# Patient Record
Sex: MALE | Race: WHITE | NOT HISPANIC OR LATINO | Employment: FULL TIME | URBAN - NONMETROPOLITAN AREA
[De-identification: names, ages, dates, MRNs, and addresses within clinical notes are randomized per-mention and may not be internally consistent; named-entity substitution may affect disease eponyms.]

---

## 2017-03-14 RX ORDER — MELOXICAM 15 MG/1
TABLET ORAL
Qty: 90 TAB | Refills: 0 | Status: SHIPPED | OUTPATIENT
Start: 2017-03-14 | End: 2017-03-22 | Stop reason: SDUPTHER

## 2017-03-22 ENCOUNTER — OFFICE VISIT (OUTPATIENT)
Dept: MEDICAL GROUP | Facility: PHYSICIAN GROUP | Age: 62
End: 2017-03-22
Payer: COMMERCIAL

## 2017-03-22 VITALS
SYSTOLIC BLOOD PRESSURE: 126 MMHG | HEIGHT: 69 IN | RESPIRATION RATE: 12 BRPM | HEART RATE: 78 BPM | OXYGEN SATURATION: 94 % | TEMPERATURE: 97.5 F | DIASTOLIC BLOOD PRESSURE: 82 MMHG | BODY MASS INDEX: 40.58 KG/M2 | WEIGHT: 274 LBS

## 2017-03-22 DIAGNOSIS — R73.03 PREDIABETES: Chronic | ICD-10-CM

## 2017-03-22 DIAGNOSIS — M25.511 CHRONIC PAIN OF BOTH SHOULDERS: ICD-10-CM

## 2017-03-22 DIAGNOSIS — E55.9 VITAMIN D DEFICIENCY: Chronic | ICD-10-CM

## 2017-03-22 DIAGNOSIS — E78.5 HYPERLIPIDEMIA, UNSPECIFIED HYPERLIPIDEMIA TYPE: ICD-10-CM

## 2017-03-22 DIAGNOSIS — M25.512 CHRONIC PAIN OF BOTH SHOULDERS: ICD-10-CM

## 2017-03-22 DIAGNOSIS — Z79.891 CHRONIC USE OF OPIATE FOR THERAPEUTIC PURPOSE: ICD-10-CM

## 2017-03-22 DIAGNOSIS — M25.561 CHRONIC PAIN OF RIGHT KNEE: ICD-10-CM

## 2017-03-22 DIAGNOSIS — G89.29 CHRONIC PAIN OF BOTH SHOULDERS: ICD-10-CM

## 2017-03-22 DIAGNOSIS — G89.29 CHRONIC PAIN OF RIGHT KNEE: ICD-10-CM

## 2017-03-22 PROCEDURE — 99213 OFFICE O/P EST LOW 20 MIN: CPT | Performed by: NURSE PRACTITIONER

## 2017-03-22 RX ORDER — MELOXICAM 15 MG/1
15 TABLET ORAL
Qty: 90 TAB | Refills: 3 | Status: SHIPPED | OUTPATIENT
Start: 2017-03-22 | End: 2018-09-07

## 2017-03-22 RX ORDER — TRAMADOL HYDROCHLORIDE 50 MG/1
50-100 TABLET ORAL EVERY 6 HOURS PRN
Qty: 240 TAB | Refills: 0 | Status: SHIPPED | OUTPATIENT
Start: 2017-04-22 | End: 2017-05-22

## 2017-03-22 RX ORDER — ATORVASTATIN CALCIUM 40 MG/1
40 TABLET, FILM COATED ORAL
Qty: 90 TAB | Refills: 3 | Status: SHIPPED | OUTPATIENT
Start: 2017-03-22 | End: 2018-02-06

## 2017-03-22 RX ORDER — TRAMADOL HYDROCHLORIDE 50 MG/1
50-100 TABLET ORAL EVERY 6 HOURS PRN
Qty: 240 TAB | Refills: 0 | Status: SHIPPED | OUTPATIENT
Start: 2017-03-22 | End: 2017-03-22 | Stop reason: SDUPTHER

## 2017-03-22 RX ORDER — TRAMADOL HYDROCHLORIDE 50 MG/1
50-100 TABLET ORAL EVERY 6 HOURS PRN
Qty: 240 TAB | Refills: 0 | Status: SHIPPED | OUTPATIENT
Start: 2017-05-22 | End: 2017-06-16 | Stop reason: SDUPTHER

## 2017-03-22 NOTE — PROGRESS NOTES
Chief Complaint   Patient presents with   • Chronic Opiate Therapy       HISTORY OF PRESENT ILLNESS: Patient is a @ age 61 here  today to discuss:    Interval history:     Hospitalizations NO     Injuries NO     Illness NO     Patient is due for his screening labs for next visit these will be ordered ahead of time. He is primarily here for his refill on his tramadol.        Shoulder pain, bilateral  ..    Chronic use of opiate for therapeutic purpose  Chronic pain recheck: shoulder pain  Last dose of controlled substance: Two tramadol 50mg today at 12: 30 pm   Chronic pain treated with tramadol taken more than 5 times a day    He  reports that he drinks about 36.0 oz of alcohol per week.  He  reports that he does not use illicit drugs.    Consequences of Chronic Opiate therapy:  (5 A's)  Analgesia: Compared to no treatment or prior treatment, pain is currently improved  Activity: improved  Adverse Events: He denies itchy skin  Aberrant Behaviors: He reports he is taking medication as prescribed and is not veering from agreed treatment regimen. There have been no inappropriate refills or lost/stolen meds reported.   Affect/Mood: Pain is not impacting patient's mood.  Patient denies depression/anxiety.    Nonnarcotic treatments that are being used: NSAIDs/ROSALES-2, topical agents and meloxicam.   Treatment goals discussed.    Opioid Risk Score: No Value exists for the : RNW#180    Interpretation of Opioid Risk Score   Score 0-3 = Low risk of abuse. Do UDS at least once per year.  Score 4-7 = Moderate risk of abuse. Do UDS 1-4 times per year.  Score 8+ = High risk of abuse. Refer to specialist.    Last order of CONTROLLED SUBSTANCE TREATMENT AGREEMENT was found on 9/20/2016 from Office Visit on 9/20/2016     UDS Summary     Patient has no health maintenance due at this time        Most recent UDS reviewed today and is consistent with prescribed medications. Patient to have repeat UDS as it was noted that he had  hydrocodone and his last when he is only taking tramadol.  I have reviewed the medical records, the Prescription Monitoring Program and I have determined that controlled substance treatment is medically indicated.          Prediabetes  Patient has chronic problem with elevated blood sugar. He will obtain fasting labs next visit.     Vitamin D deficiency  Patient has history of vitamin D problems.         Allergies:Nkda    Current Outpatient Prescriptions Ordered in [facility]   Medication Sig Dispense Refill   • [START ON 4/22/2017] tramadol (ULTRAM) 50 MG Tab Take 1-2 Tabs by mouth every 6 hours as needed for Moderate Pain or Severe Pain for up to 30 days. 240 Tab 0   • [START ON 5/22/2017] tramadol (ULTRAM) 50 MG Tab Take 1-2 Tabs by mouth every 6 hours as needed for Moderate Pain or Severe Pain for up to 30 days. 240 Tab 0   • meloxicam (MOBIC) 15 MG tablet Take 1 Tab by mouth every day. 90 Tab 3   • atorvastatin (LIPITOR) 40 MG Tab Take 1 Tab by mouth every day. 90 Tab 3   • triamcinolone acetonide (KENALOG) 0.1 % CREA Apply to affected area(s) 2 times a day. 80 g 3   • chlorhexidine (PERIDEX) 0.12 % Solution      • doxycycline (VIBRAMYCIN) 100 MG Tab      • hydrocodone-acetaminophen (NORCO) 5-325 MG Tab per tablet      • glucosamine Sulfate 500 MG Cap Take 500 mg by mouth 3 times a day, with meals.     • ascorbic acid (ASCORBIC ACID) 500 MG Tab Take 500 mg by mouth every day.     • hydrocodone-acetaminophen (NORCO) 7.5-325 MG per tablet Take 1 Tab by mouth 2 times a day as needed. 60 Tab 0   • Cholecalciferol (VITAMIN D3) 5000 UNITS CAPS Take 1 Cap by mouth every day. 30 Cap 11     No current [facility]-ordered facility-administered medications on file.       Past Medical History   Diagnosis Date   • Other abnormal glucose      BS 12/08 104   • Mixed hyperlipidemia      controlled   • Tobacco use disorder    • Allergic rhinitis, cause unspecified      controlled   • Osteoarthrosis, unspecified whether generalized or  "localized, unspecified site      controlled   • Arthritis      \"everywhere\"   • Pain      right knee   • Pure hypercholesterolemia 2009   • Elevated BP 2010   • Prediabetes 2010   • Unspecified vitamin D deficiency 2010   • Mixed hyperlipidemia 2010   • Obesity (BMI 30-39.9) 2012   • Cutaneous skin tags 2014   • Dental disorder    • Unspecified cataract        Social History   Substance Use Topics   • Smoking status: Former Smoker -- 0.50 packs/day for 40 years     Types: Cigarettes, Cigars     Quit date: 2015   • Smokeless tobacco: Never Used   • Alcohol Use: 36.0 oz/week     54 Standard drinks or equivalent, 6 Glasses of wine, 0 Cans of beer, 0 Shots of liquor per week       Family Status   Relation Status Death Age   • Mother  76     Breast cancer with mets   • Father  76     CAD, HTN, Dementia   • Brother Alive      56 in    • Sister Alive      53 in      Family History   Problem Relation Age of Onset   • Diabetes Mother    • Hypertension Mother    • Heart Disease Father      coronary artery disease with first MI at age 40   • Hypertension Father    • Hypertension Brother    • Hyperlipidemia Brother      hypercholestolemia   • Diabetes Brother    • Diabetes Other      Maternal family   • Hyperlipidemia Other       Maternal family history of  hypercholestolemia   • Diabetes Sister      Type 2 DM   • Lung Disease Sister      copd       ROS: As documented in my HPI      Exam:  Blood pressure 126/82, pulse 78, temperature 36.4 °C (97.5 °F), resp. rate 12, height 1.753 m (5' 9.02\"), weight 124.286 kg (274 lb), SpO2 94 %.  General:  Well nourished, well developed male in NAD  Head: Nontender scalp. No lesions  Neck: Supple. Symmetric Thyroid palpated. No bruits  Pulmonary:  Normal effort. No rales, ronchi, or wheezing.  Cardiovascular: Regular rate and rhythm without murmur.   Abdomen: Soft nontender, normal bowel sounds  Extremities: no clubbing, cyanosis, " or edema.  Shoulders: pain to ROM.   Lower back: paraspinous muscles tender  Knee: pain to walking.   Neurological: No focal deficits    Please note that this dictation was created using voice recognition software. I have made every reasonable attempt to correct obvious errors, but I expect that there are errors of grammar and possibly content that I did not discover before finalizing the note.    Assessment/Plan:  1. Chronic pain of both shoulders  Controlled Substance Treatment Agreement    COMP METABOLIC PANEL    LIPID PANEL    VITAMIN D,25 HYDROXY   2. Chronic use of opiate for therapeutic purpose  Controlled Substance Treatment Agreement    COMP METABOLIC PANEL    LIPID PANEL    VITAMIN D,25 HYDROXY   3. Chronic pain of right knee  Controlled Substance Treatment Agreement    tramadol (ULTRAM) 50 MG Tab    tramadol (ULTRAM) 50 MG Tab    COMP METABOLIC PANEL    LIPID PANEL    VITAMIN D,25 HYDROXY    DISCONTINUED: tramadol (ULTRAM) 50 MG Tab   4. Hyperlipidemia, unspecified hyperlipidemia type  atorvastatin (LIPITOR) 40 MG Tab    COMP METABOLIC PANEL    LIPID PANEL    VITAMIN D,25 HYDROXY   5. Prediabetes     6. Vitamin D deficiency

## 2017-03-22 NOTE — ASSESSMENT & PLAN NOTE
Chronic pain recheck: shoulder pain  Last dose of controlled substance: Two tramadol 50mg today at 12: 30 pm   Chronic pain treated with tramadol taken more than 5 times a day    He  reports that he drinks about 36.0 oz of alcohol per week.  He  reports that he does not use illicit drugs.    Consequences of Chronic Opiate therapy:  (5 A's)  Analgesia: Compared to no treatment or prior treatment, pain is currently improved  Activity: improved  Adverse Events: He denies itchy skin  Aberrant Behaviors: He reports he is taking medication as prescribed and is not veering from agreed treatment regimen. There have been no inappropriate refills or lost/stolen meds reported.   Affect/Mood: Pain is not impacting patient's mood.  Patient denies depression/anxiety.    Nonnarcotic treatments that are being used: NSAIDs/ROSALES-2, topical agents and meloxicam.   Treatment goals discussed.    Opioid Risk Score: No Value exists for the : RNW#180    Interpretation of Opioid Risk Score   Score 0-3 = Low risk of abuse. Do UDS at least once per year.  Score 4-7 = Moderate risk of abuse. Do UDS 1-4 times per year.  Score 8+ = High risk of abuse. Refer to specialist.    Last order of CONTROLLED SUBSTANCE TREATMENT AGREEMENT was found on 9/20/2016 from Office Visit on 9/20/2016     UDS Summary     Patient has no health maintenance due at this time        Most recent UDS reviewed today and is consistent with prescribed medications.    I have reviewed the medical records, the Prescription Monitoring Program and I have determined that controlled substance treatment is medically indicated.

## 2017-03-22 NOTE — MR AVS SNAPSHOT
"        Sincere Tinsley   3/22/2017 1:00 PM   Office Visit   MRN: [id number]    Department:  [facility]   Dept Phone:  [phone]    Description:  Male : 1955   Provider:  FOSTER Carlin           Reason for Visit     Chronic Opiate Therapy           Allergies as of 3/22/2017     Allergen Noted Reactions    Nkda [No Known Drug Allergy] 10/21/2009         You were diagnosed with     Chronic pain of both shoulders   [574969]       Chronic use of opiate for therapeutic purpose   [2371041]       Chronic pain of right knee   [8941077]       Hyperlipidemia, unspecified hyperlipidemia type   [8248236]         Vital Signs     Blood Pressure Pulse Temperature Respirations Height Weight    126/82 mmHg 78 36.4 °C (97.5 °F) 12 1.753 m (5' 9.02\") 124.286 kg (274 lb)    Body Mass Index Oxygen Saturation Smoking Status             40.44 kg/m2 94% Former Smoker         Basic Information     Date Of Birth Sex Race Ethnicity Preferred Language    1955 Male White Non- English      Problem List              ICD-10-CM Priority Class Noted - Resolved    Seasonal allergies J30.2 Low  2009 - Present    Prediabetes (Chronic) R73.03 High  2010 - Present    Vitamin D deficiency (Chronic) E55.9 Medium  2010 - Present    Mixed hyperlipidemia (Chronic) E78.2 Medium  2010 - Present    Obesity (BMI 30-39.9) (Chronic) E66.9 Medium  2012 - Present    Dyshidrotic eczema L30.1 Medium  2/10/2014 - Present    Shoulder pain, bilateral M25.511, M25.512 High  2/10/2014 - Present    Cutaneous skin tags L91.8 Low  2014 - Present    Bilateral chronic knee pain M25.561, M25.562, G89.29 High  2014 - Present    Parotid neoplasm D49.0 Medium  2015 - Present    Bilateral edema of lower extremity R60.0 High  8/3/2015 - Present    Chronic use of opiate for therapeutic purpose Z79.899   3/22/2017 - Present      Health Maintenance        Date Due Completion Dates    IMM ZOSTER VACCINE " 8/16/2016 ---    IMM INFLUENZA (1) 9/1/2016 ---    IMM DTaP/Tdap/Td Vaccine (2 - Td) 4/16/2022 4/16/2012    COLONOSCOPY 4/7/2024 4/7/2014            Current Immunizations     Tdap Vaccine 4/16/2012  2:32 PM      Below and/or attached are the medications your provider expects you to take. Review all of your home medications and newly ordered medications with your provider and/or pharmacist. Follow medication instructions as directed by your provider and/or pharmacist. Please keep your medication list with you and share with your provider. Update the information when medications are discontinued, doses are changed, or new medications (including over-the-counter products) are added; and carry medication information at all times in the event of emergency situations     Allergies:  NKDA - (reactions not documented)               Medications  Valid as of: March 22, 2017 -  1:22 PM    Generic Name Brand Name Tablet Size Instructions for use    Ascorbic Acid (Tab) ascorbic acid 500 MG Take 500 mg by mouth every day.        Atorvastatin Calcium (Tab) LIPITOR 40 MG Take 1 Tab by mouth every day.        Chlorhexidine Gluconate (Solution) PERIDEX 0.12 %         Cholecalciferol (Cap) vitamin D3 5000 UNITS Take 1 Cap by mouth every day.        Doxycycline Hyclate (Tab) VIBRAMYCIN 100 MG         Glucosamine Sulfate (Cap) glucosamine Sulfate 500 MG Take 500 mg by mouth 3 times a day, with meals.        Hydrocodone-Acetaminophen (Tab) NORCO 7.5-325 MG Take 1 Tab by mouth 2 times a day as needed.        Hydrocodone-Acetaminophen (Tab) NORCO 5-325 MG         Meloxicam (Tab) MOBIC 15 MG Take 1 Tab by mouth every day.        TraMADol HCl (Tab) ULTRAM 50 MG Take 1-2 Tabs by mouth every 6 hours as needed for Moderate Pain or Severe Pain for up to 30 days.        TraMADol HCl (Tab) ULTRAM 50 MG Take 1-2 Tabs by mouth every 6 hours as needed for Moderate Pain or Severe Pain for up to 30 days.        Triamcinolone Acetonide (Cream) KENALOG  0.1 % Apply to affected area(s) 2 times a day.        .                 Medicines prescribed today were sent to:     [id number] - [place], [place] - [address]    [address] [place] [address]    Phone: [phone] Fax: [phone]    Open 24 Hours?: No      Medication refill instructions:       If your prescription bottle indicates you have medication refills left, it is not necessary to call your provider’s office. Please contact your pharmacy and they will refill your medication.    If your prescription bottle indicates you do not have any refills left, you may request refills at any time through one of the following ways: The online Someecards system (except Urgent Care), by calling your provider’s office, or by asking your pharmacy to contact your provider’s office with a refill request. Medication refills are processed only during regular business hours and may not be available until the next business day. Your provider may request additional information or to have a follow-up visit with you prior to refilling your medication.   *Please Note: Medication refills are assigned a new Rx number when refilled electronically. Your pharmacy may indicate that no refills were authorized even though a new prescription for the same medication is available at the pharmacy. Please request the medicine by name with the pharmacy before contacting your provider for a refill.        Your To Do List     Future Labs/Procedures Complete By Expires    COMP METABOLIC PANEL  As directed 3/22/2018    VITAMIN D,25 HYDROXY  As directed 3/22/2018      Other Notes About Your Plan     UDS - Tramadol and Hydrocodone.            Someecards Access Code: [email or web address]  Expires: 3/31/2017  3:11 PM    Someecards  A secure, online tool to manage your health information     [employer]’s Someecards® is a secure, online tool that connects you to your personalized health information from  the privacy of your home -- day or night - making it very easy for you to manage your healthcare. Once the activation process is completed, you can even access your medical information using the Samesurf jo, which is available for free in the Apple Jo store or Google Play store.     Samesurf provides the following levels of access (as shown below):   My Chart Features   [facility] Doctor [facility]  Specialists [facility]  Urgent  Care Non-[facility]  Primary Care  Doctor   Email your healthcare team securely and privately 24/7 X X X    Manage appointments: schedule your next appointment; view details of past/upcoming appointments X      Request prescription refills. X      View recent personal medical records, including lab and immunizations X X X X   View health record, including health history, allergies, medications X X X X   Read reports about your outpatient visits, procedures, consult and ER notes X X X X   See your discharge summary, which is a recap of your hospital and/or ER visit that includes your diagnosis, lab results, and care plan. X X       How to register for Samesurf:  1. Go to  [email or web address].  2. Click on the Sign Up Now box, which takes you to the New Member Sign Up page. You will need to provide the following information:  a. Enter your Samesurf Access Code exactly as it appears at the top of this page. (You will not need to use this code after you’ve completed the sign-up process. If you do not sign up before the expiration date, you must request a new code.)   b. Enter your date of birth.   c. Enter your home email address.   d. Click Submit, and follow the next screen’s instructions.  3. Create a Samesurf ID. This will be your Samesurf login ID and cannot be changed, so think of one that is secure and easy to remember.  4. Create a Samesurf password. You can change your password at any time.  5. Enter your Password Reset Question and Answer. This can be used at a later time if you  forget your password.   6. Enter your e-mail address. This allows you to receive e-mail notifications when new information is available in [employer].  7. Click Sign Up. You can now view your health information.    For assistance activating your [employer] account, call [phone]

## 2017-06-16 ENCOUNTER — OFFICE VISIT (OUTPATIENT)
Dept: MEDICAL GROUP | Facility: PHYSICIAN GROUP | Age: 62
End: 2017-06-16
Payer: COMMERCIAL

## 2017-06-16 ENCOUNTER — HOSPITAL ENCOUNTER (OUTPATIENT)
Dept: LAB | Facility: MEDICAL CENTER | Age: 62
End: 2017-06-16
Attending: NURSE PRACTITIONER
Payer: COMMERCIAL

## 2017-06-16 VITALS
WEIGHT: 271 LBS | RESPIRATION RATE: 16 BRPM | TEMPERATURE: 98.7 F | BODY MASS INDEX: 40.14 KG/M2 | HEART RATE: 75 BPM | SYSTOLIC BLOOD PRESSURE: 124 MMHG | OXYGEN SATURATION: 94 % | HEIGHT: 69 IN | DIASTOLIC BLOOD PRESSURE: 82 MMHG

## 2017-06-16 DIAGNOSIS — E78.2 MIXED HYPERLIPIDEMIA: Chronic | ICD-10-CM

## 2017-06-16 DIAGNOSIS — R73.03 PREDIABETES: Chronic | ICD-10-CM

## 2017-06-16 DIAGNOSIS — M25.561 CHRONIC PAIN OF RIGHT KNEE: ICD-10-CM

## 2017-06-16 DIAGNOSIS — G89.29 BILATERAL CHRONIC KNEE PAIN: ICD-10-CM

## 2017-06-16 DIAGNOSIS — Z79.891 CHRONIC USE OF OPIATE FOR THERAPEUTIC PURPOSE: ICD-10-CM

## 2017-06-16 DIAGNOSIS — M25.511 CHRONIC PAIN OF BOTH SHOULDERS: ICD-10-CM

## 2017-06-16 DIAGNOSIS — E55.9 VITAMIN D DEFICIENCY: Chronic | ICD-10-CM

## 2017-06-16 DIAGNOSIS — D49.0 PAROTID NEOPLASM: ICD-10-CM

## 2017-06-16 DIAGNOSIS — M25.562 BILATERAL CHRONIC KNEE PAIN: ICD-10-CM

## 2017-06-16 DIAGNOSIS — M25.561 BILATERAL CHRONIC KNEE PAIN: ICD-10-CM

## 2017-06-16 DIAGNOSIS — G89.29 CHRONIC PAIN OF RIGHT KNEE: ICD-10-CM

## 2017-06-16 DIAGNOSIS — M25.512 CHRONIC PAIN OF BOTH SHOULDERS: ICD-10-CM

## 2017-06-16 DIAGNOSIS — G89.29 CHRONIC PAIN OF BOTH SHOULDERS: ICD-10-CM

## 2017-06-16 LAB
25(OH)D3 SERPL-MCNC: 31 NG/ML (ref 30–100)
ALBUMIN SERPL BCP-MCNC: 4.3 G/DL (ref 3.2–4.9)
ALBUMIN/GLOB SERPL: 1.7 G/DL
ALP SERPL-CCNC: 51 U/L (ref 30–99)
ALT SERPL-CCNC: 19 U/L (ref 2–50)
ANION GAP SERPL CALC-SCNC: 6 MMOL/L (ref 0–11.9)
AST SERPL-CCNC: 17 U/L (ref 12–45)
BASOPHILS # BLD AUTO: 0.9 % (ref 0–1.8)
BASOPHILS # BLD: 0.05 K/UL (ref 0–0.12)
BILIRUB SERPL-MCNC: 0.5 MG/DL (ref 0.1–1.5)
BUN SERPL-MCNC: 18 MG/DL (ref 8–22)
CALCIUM SERPL-MCNC: 9.1 MG/DL (ref 8.5–10.5)
CHLORIDE SERPL-SCNC: 108 MMOL/L (ref 96–112)
CHOLEST SERPL-MCNC: 101 MG/DL (ref 100–199)
CO2 SERPL-SCNC: 24 MMOL/L (ref 20–33)
CREAT SERPL-MCNC: 0.83 MG/DL (ref 0.5–1.4)
EOSINOPHIL # BLD AUTO: 0.33 K/UL (ref 0–0.51)
EOSINOPHIL NFR BLD: 6.3 % (ref 0–6.9)
ERYTHROCYTE [DISTWIDTH] IN BLOOD BY AUTOMATED COUNT: 40.8 FL (ref 35.9–50)
GFR SERPL CREATININE-BSD FRML MDRD: >60 ML/MIN/1.73 M 2
GLOBULIN SER CALC-MCNC: 2.6 G/DL (ref 1.9–3.5)
GLUCOSE SERPL-MCNC: 109 MG/DL (ref 65–99)
HCT VFR BLD AUTO: 41.9 % (ref 42–52)
HDLC SERPL-MCNC: 37 MG/DL
HGB BLD-MCNC: 14.2 G/DL (ref 14–18)
IMM GRANULOCYTES # BLD AUTO: 0.01 K/UL (ref 0–0.11)
IMM GRANULOCYTES NFR BLD AUTO: 0.2 % (ref 0–0.9)
LDLC SERPL CALC-MCNC: 43 MG/DL
LYMPHOCYTES # BLD AUTO: 2.09 K/UL (ref 1–4.8)
LYMPHOCYTES NFR BLD: 39.7 % (ref 22–41)
MCH RBC QN AUTO: 30.5 PG (ref 27–33)
MCHC RBC AUTO-ENTMCNC: 33.9 G/DL (ref 33.7–35.3)
MCV RBC AUTO: 89.9 FL (ref 81.4–97.8)
MONOCYTES # BLD AUTO: 0.5 K/UL (ref 0–0.85)
MONOCYTES NFR BLD AUTO: 9.5 % (ref 0–13.4)
NEUTROPHILS # BLD AUTO: 2.29 K/UL (ref 1.82–7.42)
NEUTROPHILS NFR BLD: 43.4 % (ref 44–72)
NRBC # BLD AUTO: 0 K/UL
NRBC BLD AUTO-RTO: 0 /100 WBC
PLATELET # BLD AUTO: 218 K/UL (ref 164–446)
PMV BLD AUTO: 10 FL (ref 9–12.9)
POTASSIUM SERPL-SCNC: 4.3 MMOL/L (ref 3.6–5.5)
PROT SERPL-MCNC: 6.9 G/DL (ref 6–8.2)
RBC # BLD AUTO: 4.66 M/UL (ref 4.7–6.1)
SODIUM SERPL-SCNC: 138 MMOL/L (ref 135–145)
T4 FREE SERPL-MCNC: 0.88 NG/DL (ref 0.53–1.43)
TRIGL SERPL-MCNC: 104 MG/DL (ref 0–149)
TSH SERPL DL<=0.005 MIU/L-ACNC: 1.19 UIU/ML (ref 0.3–3.7)
WBC # BLD AUTO: 5.3 K/UL (ref 4.8–10.8)

## 2017-06-16 PROCEDURE — 85025 COMPLETE CBC W/AUTO DIFF WBC: CPT

## 2017-06-16 PROCEDURE — 80053 COMPREHEN METABOLIC PANEL: CPT

## 2017-06-16 PROCEDURE — 84439 ASSAY OF FREE THYROXINE: CPT

## 2017-06-16 PROCEDURE — 82306 VITAMIN D 25 HYDROXY: CPT

## 2017-06-16 PROCEDURE — 36415 COLL VENOUS BLD VENIPUNCTURE: CPT

## 2017-06-16 PROCEDURE — 84443 ASSAY THYROID STIM HORMONE: CPT

## 2017-06-16 PROCEDURE — 99214 OFFICE O/P EST MOD 30 MIN: CPT | Performed by: NURSE PRACTITIONER

## 2017-06-16 PROCEDURE — 80061 LIPID PANEL: CPT

## 2017-06-16 RX ORDER — TRAMADOL HYDROCHLORIDE 50 MG/1
50-100 TABLET ORAL EVERY 6 HOURS PRN
Qty: 240 TAB | Refills: 0 | Status: SHIPPED | OUTPATIENT
Start: 2017-07-11 | End: 2017-06-16 | Stop reason: SDUPTHER

## 2017-06-16 RX ORDER — TRAMADOL HYDROCHLORIDE 50 MG/1
50-100 TABLET ORAL EVERY 6 HOURS PRN
Qty: 240 TAB | Refills: 0 | Status: SHIPPED | OUTPATIENT
Start: 2017-08-11 | End: 2017-08-30 | Stop reason: SDUPTHER

## 2017-06-16 ASSESSMENT — LIFESTYLE VARIABLES: HISTORY_ALCOHOL_USE: 1

## 2017-06-16 NOTE — MR AVS SNAPSHOT
"        Sincere Tinsley   2017 11:20 AM   Office Visit   MRN: [id number]    Department:  [facility]   Dept Phone:  [phone]    Description:  Male : 1955   Provider:  FOSTER Carlin           Reason for Visit     Chronic Opiate Therapy tramadol      Allergies as of 2017     Allergen Noted Reactions    Nkda [No Known Drug Allergy] 10/21/2009         You were diagnosed with     Chronic use of opiate for therapeutic purpose   [2054865]       Chronic pain of right knee   [1419815]       Vitamin D deficiency   [2972591]       Mixed hyperlipidemia   [272.2.ICD-9-CM]         Vital Signs     Blood Pressure Pulse Temperature Respirations Height Weight    124/82 mmHg 75 37.1 °C (98.7 °F) 16 1.753 m (5' 9\") 122.925 kg (271 lb)    Body Mass Index Oxygen Saturation Smoking Status             40.00 kg/m2 94% Former Smoker         Basic Information     Date Of Birth Sex Race Ethnicity Preferred Language    1955 Male White Non- English      Problem List              ICD-10-CM Priority Class Noted - Resolved    Seasonal allergies J30.2 Low  2009 - Present    Prediabetes (Chronic) R73.03 High  2010 - Present    Vitamin D deficiency (Chronic) E55.9 Medium  2010 - Present    Mixed hyperlipidemia (Chronic) E78.2 Medium  2010 - Present    Obesity (BMI 30-39.9) (Chronic) E66.9 Medium  2012 - Present    Dyshidrotic eczema L30.1 Medium  2/10/2014 - Present    Shoulder pain, bilateral M25.511, M25.512 High  2/10/2014 - Present    Cutaneous skin tags L91.8 Low  2014 - Present    Bilateral chronic knee pain M25.561, M25.562, G89.29 High  2014 - Present    Parotid neoplasm D49.0 Medium  2015 - Present    Bilateral edema of lower extremity R60.0 High  8/3/2015 - Present    Chronic use of opiate for therapeutic purpose Z79.891   3/22/2017 - Present      Health Maintenance        Date Due Completion Dates    IMM ZOSTER VACCINE 2016 ---    IMM " DTaP/Tdap/Td Vaccine (2 - Td) 4/16/2022 4/16/2012    COLONOSCOPY 4/7/2024 4/7/2014            Current Immunizations     Tdap Vaccine 4/16/2012  2:32 PM      Below and/or attached are the medications your provider expects you to take. Review all of your home medications and newly ordered medications with your provider and/or pharmacist. Follow medication instructions as directed by your provider and/or pharmacist. Please keep your medication list with you and share with your provider. Update the information when medications are discontinued, doses are changed, or new medications (including over-the-counter products) are added; and carry medication information at all times in the event of emergency situations     Allergies:  NKDA - (reactions not documented)               Medications  Valid as of: June 16, 2017 - 11:35 AM    Generic Name Brand Name Tablet Size Instructions for use    Ascorbic Acid (Tab) ascorbic acid 500 MG Take 500 mg by mouth every day.        Atorvastatin Calcium (Tab) LIPITOR 40 MG Take 1 Tab by mouth every day.        Chlorhexidine Gluconate (Solution) PERIDEX 0.12 %         Cholecalciferol (Cap) vitamin D3 5000 UNITS Take 1 Cap by mouth every day.        Doxycycline Hyclate (Tab) VIBRAMYCIN 100 MG         Glucosamine Sulfate (Cap) glucosamine Sulfate 500 MG Take 500 mg by mouth 3 times a day, with meals.        Hydrocodone-Acetaminophen (Tab) NORCO 7.5-325 MG Take 1 Tab by mouth 2 times a day as needed.        Hydrocodone-Acetaminophen (Tab) NORCO 5-325 MG         Meloxicam (Tab) MOBIC 15 MG Take 1 Tab by mouth every day.        TraMADol HCl (Tab) ULTRAM 50 MG Take 1-2 Tabs by mouth every 6 hours as needed for Moderate Pain or Severe Pain for up to 30 days.        Triamcinolone Acetonide (Cream) KENALOG 0.1 % Apply to affected area(s) 2 times a day.        .                 Medicines prescribed today were sent to:     [facility] [address] [place] [address], [place] [address] [facility] [address] [facility]  [facility] [address] [place] [address]    Phone: [phone] Fax: [phone]    Open 24 Hours?: No      Medication refill instructions:       If your prescription bottle indicates you have medication refills left, it is not necessary to call your provider’s office. Please contact your pharmacy and they will refill your medication.    If your prescription bottle indicates you do not have any refills left, you may request refills at any time through one of the following ways: The online [employer] system (except Urgent Care), by calling your provider’s office, or by asking your pharmacy to contact your provider’s office with a refill request. Medication refills are processed only during regular business hours and may not be available until the next business day. Your provider may request additional information or to have a follow-up visit with you prior to refilling your medication.   *Please Note: Medication refills are assigned a new Rx number when refilled electronically. Your pharmacy may indicate that no refills were authorized even though a new prescription for the same medication is available at the pharmacy. Please request the medicine by name with the pharmacy before contacting your provider for a refill.        Your To Do List     Future Labs/Procedures Complete By Expires    CBC WITH DIFFERENTIAL  As directed 6/16/2018    COMP METABOLIC PANEL  As directed 6/16/2018    VITAMIN D,25 HYDROXY  As directed 6/17/2018      Other Notes About Your Plan     UDS - Tramadol and Hydrocodone.            [employer] Access Code: [email or web address]  Expires: 6/29/2017  3:58 AM    [employer]  A secure, online tool to manage your health information     [facility] [employer]® is a secure, online tool that connects you to your personalized health information from the privacy of your home -- day or night - making it very easy for you to manage your healthcare. Once the activation process is completed, you can  even access your medical information using the [employer] jo, which is available for free in the Apple Jo store or Google Play store.     [employer] provides the following levels of access (as shown below):   My Chart Features   [facility] Doctor [facility]  Specialists [facility]  Urgent  Care Non-[facility]  Primary Care  Doctor   Email your healthcare team securely and privately 24/7 X X X    Manage appointments: schedule your next appointment; view details of past/upcoming appointments X      Request prescription refills. X      View recent personal medical records, including lab and immunizations X X X X   View health record, including health history, allergies, medications X X X X   Read reports about your outpatient visits, procedures, consult and ER notes X X X X   See your discharge summary, which is a recap of your hospital and/or ER visit that includes your diagnosis, lab results, and care plan. X X       How to register for [employer]:  1. Go to  [email or web address].  2. Click on the Sign Up Now box, which takes you to the New Member Sign Up page. You will need to provide the following information:  a. Enter your [employer] Access Code exactly as it appears at the top of this page. (You will not need to use this code after you’ve completed the sign-up process. If you do not sign up before the expiration date, you must request a new code.)   b. Enter your date of birth.   c. Enter your home email address.   d. Click Submit, and follow the next screen’s instructions.  3. Create a [employer] ID. This will be your [employer] login ID and cannot be changed, so think of one that is secure and easy to remember.  4. Create a [employer] password. You can change your password at any time.  5. Enter your Password Reset Question and Answer. This can be used at a later time if you forget your password.   6. Enter your e-mail address. This allows you to receive e-mail notifications when new information is available in  [employer].  7. Click Sign Up. You can now view your health information.    For assistance activating your [employer] account, call [phone]

## 2017-06-16 NOTE — ASSESSMENT & PLAN NOTE
Chronic pain recheck:   Last dose of controlled substance:   Chronic pain treated with tramadol  taken more than 5 times a day    He  reports that he drinks about 36.0 oz of alcohol per week.  He  reports that he does not use illicit drugs.    Consequences of Chronic Opiate therapy:  (5 A's)  Analgesia: Compared to no treatment or prior treatment, pain is currently improved  Activity: improved  Adverse Events: He denies constipation, dry mouth, itchy skin, nausea and sedation  Aberrant Behaviors: He reports he is taking medication as prescribed and is not veering from agreed treatment regimen. There have been no inappropriate refills or lost/stolen meds reported.   Affect/Mood: Pain is impacting patient's mood.  Patient denies depression/anxiety.    Nonnarcotic treatments that are being used: Tylenol.   Treatment goals discussed.    Opioid Risk Score: 9    Interpretation of Opioid Risk Score   Score 0-3 = Low risk of abuse. Do UDS at least once per year.  Score 4-7 = Moderate risk of abuse. Do UDS 1-4 times per year.  Score 8+ = High risk of abuse. Refer to specialist.    Last order of CONTROLLED SUBSTANCE TREATMENT AGREEMENT was found on 3/22/2017 from Office Visit on 3/22/2017     UDS Summary                URINE DRUG SCREEN Next Due 3/17/2018      Done 3/22/2017 [facility] PAIN MANAGEMENT SCREEN     Patient has more history with this topic...        Most recent UDS reviewed today and is not consistent with prescribed medications.    I have reviewed the medical records, the Prescription Monitoring Program and I have determined that controlled substance treatment is medically indicated.

## 2017-06-18 ENCOUNTER — TELEPHONE (OUTPATIENT)
Dept: MEDICAL GROUP | Facility: PHYSICIAN GROUP | Age: 62
End: 2017-06-18

## 2017-06-19 NOTE — ASSESSMENT & PLAN NOTE
Patient had Needle biopsy of tail of parotid. 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 This was two years ago. NO recurrence. Will discuss with patient next visit.

## 2017-06-19 NOTE — ASSESSMENT & PLAN NOTE
Patient had recent CMP drawn.   Fasting blood sugar at 109.  Weight loss and regular physical exercise would be helpful.

## 2017-06-19 NOTE — PROGRESS NOTES
Chief Complaint   Patient presents with   • Chronic Opiate Therapy     tramadol       HISTORY OF PRESENT ILLNESS: Patient is a @ age 61 here  today to discuss:    Interval history:     Hospitalizations NO     Injuries NO     Illness NO     Patient also mentions having difficulty getting to sleep, but does not want to take sleeping pills. He states that it is difficult to go to sleep due to racing thoughts and not able to turn off all the information going through his mind.     Depression Screen (PHQ-2/PHQ-9) 6/16/2015 9/20/2016   PHQ-2 Total Score 1 -   PHQ-2 Total Score - 0   PHQ-9 Total Score 1 -       Interpretation of PHQ-9 Total Score   Score Severity   1-4 Minimal Depression   5-9 Mild Depression   10-14 Moderate Depression   15-19 Moderately Severe Depression   20-27 Severe Depression                   Chronic use of opiate for therapeutic purpose  Chronic pain recheck:   Last dose of controlled substance:   Chronic pain treated with tramadol  taken more than 5 times a day    He  reports that he drinks about 36.0 oz of alcohol per week. Discussed the need to not drink and take pain medications  He  reports that he does not use illicit drugs.    Consequences of Chronic Opiate therapy:  (5 A's)  Analgesia: Compared to no treatment or prior treatment, pain is currently improved  Activity: improved  Adverse Events: He denies constipation, dry mouth, itchy skin, nausea and sedation  Aberrant Behaviors: He reports he is taking medication as prescribed and IS veering from agreed treatment regimen. Basically he admits to having to take 8 pills a day, and not aware that this could be dangerous to herself. He was counseled extensively on the max amount of tramadol 50 mg that he can take Percocet. There have been no inappropriate refills or lost/stolen meds reported.   Affect/Mood: Pain is impacting patient's mood.  Patient denies depression/anxiety.Although he is having problems going to sleep.     Nonnarcotic  treatments that are being used: Tylenol.   Treatment goals discussed.    Opioid Risk Score: 9    Interpretation of Opioid Risk Score   Score 0-3 = Low risk of abuse. Do UDS at least once per year.  Score 4-7 = Moderate risk of abuse. Do UDS 1-4 times per year.  Score 8+ = High risk of abuse. Refer to specialist.    Last order of CONTROLLED SUBSTANCE TREATMENT AGREEMENT was found on 3/22/2017 from Office Visit on 3/22/2017     UDS Summary                URINE DRUG SCREEN Next Due 3/17/2018      Done 3/22/2017 [facility] PAIN MANAGEMENT SCREEN     Patient has more history with this topic...        Most recent UDS reviewed today and is not consistent with prescribed medications.    I have reviewed the medical records, the Prescription Monitoring Program and I have determined that controlled substance treatment is medically indicated.          Bilateral chronic knee pain  Patient continues to have FMLA . This is available to him to use periodically for his knee pain without consequences for his work record.   Patient had MRI of knee - right. Meniscal pathology. Patient continues to take pain medication for this problem. At one time patient was taking hydrocodone and tramadol for pain. He currently has RX for 270 tablets of 50 mg. He states that he has taken uptowards 8=10 tablets of 50 mg tramadol a day. I have recommended that he keep to 6-8 tablets a day, as he could have serious side affects.   He will also obtain labs for me.     Shoulder pain, bilateral  Patient has chronic shoulder pain. This repetitive motion pain. No current weakness or numbness to fingers, hands, or arm. Painful for ROM. Declines PT.     Prediabetes  Patient had recent CMP drawn.   Fasting blood sugar at 109.  Weight loss and regular physical exercise would be helpful.     Parotid neoplasm  Patient had Needle biopsy of tail of parotid. 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 This was two years ago. NO recurrence. Will discuss  "with patient next visit.         Allergies:Nkda    Current Outpatient Prescriptions Ordered in [facility]   Medication Sig Dispense Refill   • [START ON 8/11/2017] tramadol (ULTRAM) 50 MG Tab Take 1-2 Tabs by mouth every 6 hours as needed for Moderate Pain or Severe Pain for up to 30 days. 240 Tab 0   • meloxicam (MOBIC) 15 MG tablet Take 1 Tab by mouth every day. 90 Tab 3   • atorvastatin (LIPITOR) 40 MG Tab Take 1 Tab by mouth every day. 90 Tab 3   • hydrocodone-acetaminophen (NORCO) 7.5-325 MG per tablet Take 1 Tab by mouth 2 times a day as needed. 60 Tab 0   • triamcinolone acetonide (KENALOG) 0.1 % CREA Apply to affected area(s) 2 times a day. 80 g 3   • chlorhexidine (PERIDEX) 0.12 % Solution      • doxycycline (VIBRAMYCIN) 100 MG Tab      • hydrocodone-acetaminophen (NORCO) 5-325 MG Tab per tablet      • glucosamine Sulfate 500 MG Cap Take 500 mg by mouth 3 times a day, with meals.     • ascorbic acid (ASCORBIC ACID) 500 MG Tab Take 500 mg by mouth every day.     • Cholecalciferol (VITAMIN D3) 5000 UNITS CAPS Take 1 Cap by mouth every day. 30 Cap 11     No current [facility]-ordered facility-administered medications on file.       Past Medical History   Diagnosis Date   • Other abnormal glucose      BS 12/08 104   • Mixed hyperlipidemia      controlled   • Tobacco use disorder    • Allergic rhinitis, cause unspecified      controlled   • Osteoarthrosis, unspecified whether generalized or localized, unspecified site      controlled   • Arthritis      \"everywhere\"   • Pain      right knee   • Pure hypercholesterolemia 11/9/2009   • Elevated BP 2/8/2010   • Prediabetes 2/8/2010   • Unspecified vitamin D deficiency 9/20/2010   • Mixed hyperlipidemia 9/20/2010   • Obesity (BMI 30-39.9) 1/9/2012   • Cutaneous skin tags 8/28/2014   • Dental disorder    • Unspecified cataract        Social History   Substance Use Topics   • Smoking status: Former Smoker -- 0.50 packs/day for 40 years     Types: Cigarettes, Cigars     Quit date: " "2015   • Smokeless tobacco: Never Used   • Alcohol Use: 36.0 oz/week     54 Standard drinks or equivalent, 6 Glasses of wine, 0 Cans of beer, 0 Shots of liquor per week       Family Status   Relation Status Death Age   • Mother  76     Breast cancer with mets   • Father  76     CAD, HTN, Dementia   • Brother Alive      56 in    • Sister Alive      53 in      Family History   Problem Relation Age of Onset   • Diabetes Mother    • Hypertension Mother    • Heart Disease Father      coronary artery disease with first MI at age 40   • Hypertension Father    • Hypertension Brother    • Hyperlipidemia Brother      hypercholestolemia   • Diabetes Brother    • Diabetes Other      Maternal family   • Hyperlipidemia Other       Maternal family history of  hypercholestolemia   • Diabetes Sister      Type 2 DM   • Lung Disease Sister      copd       ROS: As documented in my HPI      Exam:  Blood pressure 124/82, pulse 75, temperature 37.1 °C (98.7 °F), resp. rate 16, height 1.753 m (5' 9\"), weight 122.925 kg (271 lb), SpO2 94 %.  General:  Well nourished, well developed male in NAD  Head: Nontender scalp. No lesions  Neck: Supple. Symmetric Thyroid palpated. No bruits  Pulmonary:  Normal effort. No rales, ronchi, or wheezing.  Cardiovascular: Regular rate and rhythm without murmur.   Extremities: Both knees painful to walk. No redness, swelling or deformity noted  Psych: Alert and oriented x3.  Neurological: No focal deficits    Please note that this dictation was created using voice recognition software. I have made every reasonable attempt to correct obvious errors, but I expect that there are errors of grammar and possibly content that I did not discover before finalizing the note.    Assessment/Plan:  1. Chronic use of opiate for therapeutic purpose  COMP METABOLIC PANEL    LIPID PANEL    CBC WITH DIFFERENTIAL    TSH+FREE T4    VITAMIN D,25 HYDROXY   2. Chronic pain of right knee  tramadol " (ULTRAM) 50 MG Tab    COMP METABOLIC PANEL    LIPID PANEL    CBC WITH DIFFERENTIAL    TSH+FREE T4    VITAMIN D,25 HYDROXY    DISCONTINUED: tramadol (ULTRAM) 50 MG Tab   3. Vitamin D deficiency  COMP METABOLIC PANEL    LIPID PANEL    CBC WITH DIFFERENTIAL    TSH+FREE T4    VITAMIN D,25 HYDROXY   4. Mixed hyperlipidemia  COMP METABOLIC PANEL    LIPID PANEL    CBC WITH DIFFERENTIAL    TSH+FREE T4    VITAMIN D,25 HYDROXY   5. Bilateral chronic knee pain   Current status of condition is chronic and controlled on therapy.  Tramadol 50 mg 1-2 every 6 hours no more than 8 a day.    6. Chronic pain of both shoulders   pain medication    7. Prediabetes   controlled    8. Parotid neoplasm   stable

## 2017-06-19 NOTE — ASSESSMENT & PLAN NOTE
Patient continues to have FMLA . This is available to him to use periodically for his knee pain without consequences for his work record.   Patient had MRI of knee - right. Meniscal pathology. Patient continues to take pain medication for this problem. At one time patient was taking hydrocodone and tramadol for pain. He currently has RX for 270 tablets of 50 mg. He states that he has taken uptowards 8=10 tablets of 50 mg tramadol a day. I have recommended that he keep to 6-8 tablets a day, as he could have serious side affects.   He will also obtain labs for me.

## 2017-06-19 NOTE — ASSESSMENT & PLAN NOTE
Patient has chronic shoulder pain. This repetitive motion pain. No current weakness or numbness to fingers, hands, or arm. Painful for ROM. Declines PT.

## 2017-08-30 ENCOUNTER — OFFICE VISIT (OUTPATIENT)
Dept: MEDICAL GROUP | Facility: PHYSICIAN GROUP | Age: 62
End: 2017-08-30
Payer: COMMERCIAL

## 2017-08-30 VITALS
DIASTOLIC BLOOD PRESSURE: 84 MMHG | HEIGHT: 69 IN | SYSTOLIC BLOOD PRESSURE: 134 MMHG | WEIGHT: 264 LBS | OXYGEN SATURATION: 95 % | TEMPERATURE: 97.4 F | BODY MASS INDEX: 39.1 KG/M2 | RESPIRATION RATE: 16 BRPM | HEART RATE: 76 BPM

## 2017-08-30 DIAGNOSIS — Z79.891 CHRONIC USE OF OPIATE FOR THERAPEUTIC PURPOSE: ICD-10-CM

## 2017-08-30 DIAGNOSIS — G89.29 CHRONIC PAIN OF BOTH SHOULDERS: ICD-10-CM

## 2017-08-30 DIAGNOSIS — M25.561 CHRONIC PAIN OF RIGHT KNEE: ICD-10-CM

## 2017-08-30 DIAGNOSIS — M25.511 CHRONIC PAIN OF BOTH SHOULDERS: ICD-10-CM

## 2017-08-30 DIAGNOSIS — M25.512 CHRONIC PAIN OF BOTH SHOULDERS: ICD-10-CM

## 2017-08-30 DIAGNOSIS — G89.29 CHRONIC PAIN OF RIGHT KNEE: ICD-10-CM

## 2017-08-30 DIAGNOSIS — R73.03 PREDIABETES: Chronic | ICD-10-CM

## 2017-08-30 DIAGNOSIS — E78.2 MIXED HYPERLIPIDEMIA: Chronic | ICD-10-CM

## 2017-08-30 PROCEDURE — 99214 OFFICE O/P EST MOD 30 MIN: CPT | Performed by: NURSE PRACTITIONER

## 2017-08-30 RX ORDER — TRAMADOL HYDROCHLORIDE 50 MG/1
50-100 TABLET ORAL EVERY 6 HOURS PRN
Qty: 240 TAB | Refills: 0 | Status: SHIPPED | OUTPATIENT
Start: 2017-11-18 | End: 2017-12-18

## 2017-08-30 RX ORDER — TRAMADOL HYDROCHLORIDE 50 MG/1
50-100 TABLET ORAL EVERY 6 HOURS PRN
Qty: 240 TAB | Refills: 0 | Status: SHIPPED | OUTPATIENT
Start: 2017-10-18 | End: 2017-08-30 | Stop reason: SDUPTHER

## 2017-08-30 RX ORDER — TRAMADOL HYDROCHLORIDE 50 MG/1
50-100 TABLET ORAL EVERY 6 HOURS PRN
Qty: 240 TAB | Refills: 0 | Status: SHIPPED | OUTPATIENT
Start: 2017-09-18 | End: 2017-08-30 | Stop reason: SDUPTHER

## 2017-08-30 ASSESSMENT — PATIENT HEALTH QUESTIONNAIRE - PHQ9: CLINICAL INTERPRETATION OF PHQ2 SCORE: 0

## 2017-08-30 NOTE — ASSESSMENT & PLAN NOTE
Chronic pain recheck:   Last dose of controlled substance: this am   Chronic pain treated with Tramadol  taken more than 5 times a day    He  reports that he drinks about 36.0 oz of alcohol per week .  He  reports that he does not use drugs.    Consequences of Chronic Opiate therapy:  (5 A's)  Analgesia: Compared to no treatment or prior treatment, pain is currently improved  Activity: improved  Adverse Events: He denies constipation, dry mouth, itchy skin, nausea and sedation  Aberrant Behaviors: He reports he is taking medication as prescribed and is not veering from agreed treatment regimen. There have been no inappropriate refills or lost/stolen meds reported.   Affect/Mood: Pain is not impacting patient's mood.  Patient denies depression/anxiety.    Nonnarcotic treatments that are being used: none.   Treatment goals discussed.    Opioid Risk Score: 9    Interpretation of Opioid Risk Score   Score 0-3 = Low risk of abuse. Do UDS at least once per year.  Score 4-7 = Moderate risk of abuse. Do UDS 1-4 times per year.  Score 8+ = High risk of abuse. Refer to specialist.    Last order of CONTROLLED SUBSTANCE TREATMENT AGREEMENT was found on 3/22/2017 from Office Visit on 3/22/2017     UDS Summary                URINE DRUG SCREEN Next Due 3/17/2018      Done 3/22/2017 [facility] PAIN MANAGEMENT SCREEN     Patient has more history with this topic...        Most recent UDS reviewed today and is consistent with prescribed medications.    I have reviewed the medical records, the Prescription Monitoring Program and I have determined that controlled substance treatment is medically indicated.

## 2017-08-30 NOTE — PROGRESS NOTES
Chief Complaint   Patient presents with   • Pain     medication refill / fv labs       HISTORY OF PRESENT ILLNESS: Patient is a @ age 62 here  today to discuss:     Interval history:     Hospitalizations  NO     Injuries  NO     Illness  NO     Patient has lost weight, feeling better. Portion control .         Shoulder pain, bilateral  Patient takes pain medication for his chronic shoulder pain.    Prediabetes  Patient just had CMP.     Mixed hyperlipidemia  Patient recently had lipid profile.     Chronic use of opiate for therapeutic purpose  Chronic pain recheck:   Last dose of controlled substance: this am   Chronic pain treated with Tramadol  taken more than 5 times a day    He  reports that he drinks about 36.0 oz of alcohol per week .  He  reports that he does not use drugs.    Consequences of Chronic Opiate therapy:  (5 A's)  Analgesia: Compared to no treatment or prior treatment, pain is currently improved  Activity: improved  Adverse Events: He denies constipation, dry mouth, itchy skin, nausea and sedation  Aberrant Behaviors: He reports he is taking medication as prescribed and is not veering from agreed treatment regimen. There have been no inappropriate refills or lost/stolen meds reported.   Affect/Mood: Pain is not impacting patient's mood.  Patient denies depression/anxiety.    Nonnarcotic treatments that are being used: none.   Treatment goals discussed.    Opioid Risk Score: 9    Interpretation of Opioid Risk Score   Score 0-3 = Low risk of abuse. Do UDS at least once per year.  Score 4-7 = Moderate risk of abuse. Do UDS 1-4 times per year.  Score 8+ = High risk of abuse. Refer to specialist.    Last order of CONTROLLED SUBSTANCE TREATMENT AGREEMENT was found on 3/22/2017 from Office Visit on 3/22/2017     UDS Summary                URINE DRUG SCREEN Next Due 3/17/2018      Done 3/22/2017 [facility] PAIN MANAGEMENT SCREEN     Patient has more history with this topic...        Most recent UDS  "reviewed today and is consistent with prescribed medications.    I have reviewed the medical records, the Prescription Monitoring Program and I have determined that controlled substance treatment is medically indicated.            Allergies:Nkda [no known drug allergy]    Current Outpatient Prescriptions Ordered in [facility]   Medication Sig Dispense Refill   • [START ON 11/18/2017] tramadol (ULTRAM) 50 MG Tab Take 1-2 Tabs by mouth every 6 hours as needed for Moderate Pain or Severe Pain for up to 30 days. 240 Tab 0   • meloxicam (MOBIC) 15 MG tablet Take 1 Tab by mouth every day. 90 Tab 3   • atorvastatin (LIPITOR) 40 MG Tab Take 1 Tab by mouth every day. 90 Tab 3   • glucosamine Sulfate 500 MG Cap Take 500 mg by mouth 3 times a day, with meals.     • hydrocodone-acetaminophen (NORCO) 7.5-325 MG per tablet Take 1 Tab by mouth 2 times a day as needed. 60 Tab 0   • triamcinolone acetonide (KENALOG) 0.1 % CREA Apply to affected area(s) 2 times a day. 80 g 3   • chlorhexidine (PERIDEX) 0.12 % Solution      • doxycycline (VIBRAMYCIN) 100 MG Tab      • hydrocodone-acetaminophen (NORCO) 5-325 MG Tab per tablet      • ascorbic acid (ASCORBIC ACID) 500 MG Tab Take 500 mg by mouth every day.     • Cholecalciferol (VITAMIN D3) 5000 UNITS CAPS Take 1 Cap by mouth every day. 30 Cap 11     No current [facility]-ordered facility-administered medications on file.        Past Medical History:   Diagnosis Date   • Cutaneous skin tags 8/28/2014   • Obesity (BMI 30-39.9) 1/9/2012   • Unspecified vitamin D deficiency 9/20/2010   • Mixed hyperlipidemia 9/20/2010   • Elevated BP 2/8/2010   • Prediabetes 2/8/2010   • Pure hypercholesterolemia 11/9/2009   • Allergic rhinitis, cause unspecified     controlled   • Arthritis     \"everywhere\"   • Dental disorder    • Mixed hyperlipidemia     controlled   • Osteoarthrosis, unspecified whether generalized or localized, unspecified site     controlled   • Other abnormal glucose     BS 12/08 104   • Pain  " "   right knee   • Tobacco use disorder    • Unspecified cataract        Social History   Substance Use Topics   • Smoking status: Former Smoker     Packs/day: 0.50     Years: 40.00     Types: Cigarettes, Cigars     Quit date: 2015   • Smokeless tobacco: Never Used   • Alcohol use 36.0 oz/week     6 Glasses of wine, 54 Standard drinks or equivalent per week       Family Status   Relation Status   • Mother  at age 76    Breast cancer with mets   • Father  at age 76    CAD, HTN, Dementia   • Brother Alive    56 in    • Sister Alive    53 in    • Other    • Other      Family History   Problem Relation Age of Onset   • Diabetes Mother    • Hypertension Mother    • Heart Disease Father      coronary artery disease with first MI at age 40   • Hypertension Father    • Hypertension Brother    • Hyperlipidemia Brother      hypercholestolemia   • Diabetes Brother    • Diabetes Sister      Type 2 DM   • Lung Disease Sister      copd   • Diabetes Other      Maternal family   • Hyperlipidemia Other       Maternal family history of  hypercholestolemia       ROS: As documented in my HPI      Exam:  Blood pressure 134/84, pulse 76, temperature 36.3 °C (97.4 °F), resp. rate 16, height 1.753 m (5' 9\"), weight 119.7 kg (264 lb), SpO2 95 %.  General:  Well nourished, well developed male in NAD  Head: Nontender scalp. No lesions  Neck: Supple. Symmetric Thyroid palpated. No bruits  Pulmonary:  Normal effort. No rales, ronchi, or wheezing.  Cardiovascular: Regular rate and rhythm without murmur.   Shoulders: Continue to have pain with ROM. Tramadol very effective.   Abdomen: Soft nontender, normal bowel sounds  Extremities:knees not painful today. Normal gait.   Psych: Alert and oriented x3.  Neurological: No focal deficits    Please note that this dictation was created using voice recognition software. I have made every reasonable attempt to correct obvious errors, but I expect that there are errors of " grammar and possibly content that I did not discover before finalizing the note.    Assessment/Plan:  1. Chronic pain of both shoulders  Current status of condition is chronic and controlled on therapy.     2. Prediabetes  stable   3. Mixed hyperlipidemia   Current status of condition is chronic and controlled on therapy.     4. Chronic use of opiate for therapeutic purpose  Current status of condition is chronic and controlled on therapy.     5. Chronic pain of right knee  tramadol (ULTRAM) 50 MG Tab              return in 3 months.

## 2017-09-14 ENCOUNTER — OFFICE VISIT (OUTPATIENT)
Dept: URGENT CARE | Facility: PHYSICIAN GROUP | Age: 62
End: 2017-09-14
Payer: COMMERCIAL

## 2017-09-14 VITALS
HEIGHT: 69 IN | BODY MASS INDEX: 39.55 KG/M2 | RESPIRATION RATE: 16 BRPM | WEIGHT: 267 LBS | SYSTOLIC BLOOD PRESSURE: 128 MMHG | OXYGEN SATURATION: 97 % | TEMPERATURE: 97 F | DIASTOLIC BLOOD PRESSURE: 90 MMHG | HEART RATE: 88 BPM

## 2017-09-14 DIAGNOSIS — L50.9 HIVES: ICD-10-CM

## 2017-09-14 DIAGNOSIS — E66.9 OBESITY (BMI 35.0-39.9 WITHOUT COMORBIDITY): ICD-10-CM

## 2017-09-14 PROCEDURE — 99203 OFFICE O/P NEW LOW 30 MIN: CPT | Mod: 25 | Performed by: FAMILY MEDICINE

## 2017-09-14 RX ORDER — TRIAMCINOLONE ACETONIDE 40 MG/ML
60 INJECTION, SUSPENSION INTRA-ARTICULAR; INTRAMUSCULAR ONCE
Status: COMPLETED | OUTPATIENT
Start: 2017-09-14 | End: 2017-09-14

## 2017-09-14 RX ORDER — HYDROXYZINE HYDROCHLORIDE 25 MG/1
TABLET, FILM COATED ORAL
Qty: 20 TAB | Refills: 1 | Status: SHIPPED | OUTPATIENT
Start: 2017-09-14 | End: 2018-09-07

## 2017-09-14 RX ORDER — PREDNISONE 20 MG/1
TABLET ORAL
Qty: 7 TAB | Refills: 0 | Status: SHIPPED | OUTPATIENT
Start: 2017-09-14

## 2017-09-14 RX ADMIN — TRIAMCINOLONE ACETONIDE 60 MG: 40 INJECTION, SUSPENSION INTRA-ARTICULAR; INTRAMUSCULAR at 13:11

## 2017-09-14 NOTE — PROGRESS NOTES
Chief Complaint:    Chief Complaint   Patient presents with   • Rash       History of Present Illness:    This is a new problem. Has widespread itchy rash and itching that started on 9/12/17. Problem is moderate-severe, preventing him from sleeping. Tried Benadryl p.o. and topical Triamcinolone cream without help. No similar prior episodes. No new products or exposures he can think of.      Review of Systems:    Constitutional: Negative for fever, chills, and diaphoresis.   Eyes: Negative for change in vision, photophobia, pain, redness, and discharge.  ENT: Negative for ear pain, ear discharge, hearing loss, tinnitus, nasal congestion, nosebleeds, and sore throat.    Respiratory: Negative for cough, hemoptysis, sputum production, shortness of breath, wheezing, and stridor.    Cardiovascular: Negative for chest pain, palpitations, orthopnea, claudication, leg swelling, and PND.   Gastrointestinal: Negative for abdominal pain, nausea, vomiting, diarrhea, constipation, blood in stool, and melena.   Genitourinary: Negative for dysuria, urinary urgency, urinary frequency, hematuria, and flank pain.   Musculoskeletal: No new symptoms.  Skin: See HPI.   Neurological: Negative for dizziness, tingling, tremors, sensory change, speech change, focal weakness, seizures, loss of consciousness, and headaches.   Endo: Negative for polydipsia.   Heme: Does not bruise/bleed easily.   Psychiatric/Behavioral: Negative for depression, suicidal ideas, hallucinations, memory loss and substance abuse. The patient is not nervous/anxious and does not have insomnia.      Past Medical History:    Past Medical History:   Diagnosis Date   • Cutaneous skin tags 8/28/2014   • Obesity (BMI 30-39.9) 1/9/2012   • Unspecified vitamin D deficiency 9/20/2010   • Mixed hyperlipidemia 9/20/2010   • Elevated BP 2/8/2010   • Prediabetes 2/8/2010   • Pure hypercholesterolemia 11/9/2009   • Allergic rhinitis, cause unspecified     controlled   • Arthritis   "   \"everywhere\"   • Dental disorder    • Mixed hyperlipidemia     controlled   • Osteoarthrosis, unspecified whether generalized or localized, unspecified site     controlled   • Other abnormal glucose     BS 12/08 104   • Pain     right knee   • Tobacco use disorder    • Unspecified cataract        Past Surgical History:    Past Surgical History:   Procedure Laterality Date   • PAROTIDECTOMY SUPERFICIAL Right 6/26/2015    Procedure: PAROTIDECTOMY SUPERFICIAL;  Surgeon: Shen Crawford M.D.;  Location: SURGERY SAME DAY [facility];  Service:    • KNEE ARTHROSCOPY  10/22/2009    Performed by WON DOTSON at SURGERY [facility]   • LOOSE BODY REMOVAL  10/22/2009    Performed by WON DOTSON at SURGERY [facility]   • MENISCECTOMY  10/22/2009    Performed by WON DOTSON at SURGERY [facility]   • HARDWARE REMOVAL ORTHO  1976    right arm   • FOREARM ORIF  1975    right       Social History:    Social History     Social History   • Marital status:      Spouse name: N/A   • Number of children: N/A   • Years of education: N/A     Occupational History   • Not on file.     Social History Main Topics   • Smoking status: Former Smoker     Packs/day: 0.50     Years: 40.00     Types: Cigarettes, Cigars     Quit date: 4/24/2015   • Smokeless tobacco: Never Used   • Alcohol use 36.0 oz/week     6 Glasses of wine, 54 Standard drinks or equivalent per week   • Drug use: No   • Sexual activity: Yes     Partners: Female     Birth control/ protection: Post-Menopausal      Comment: , works at  [employer]     Other Topics Concern   • Exercise Yes     doing rehab for knee surgery     Social History Narrative    , works at the [employer]       Family History:    Family History   Problem Relation Age of Onset   • Diabetes Mother    • Hypertension Mother    • Heart Disease Father      coronary artery disease with first MI at age 40   • Hypertension Father    • " "Hypertension Brother    • Hyperlipidemia Brother      hypercholestolemia   • Diabetes Brother    • Diabetes Sister      Type 2 DM   • Lung Disease Sister      copd   • Diabetes Other      Maternal family   • Hyperlipidemia Other       Maternal family history of  hypercholestolemia       Medications:    Current Outpatient Prescriptions on File Prior to Visit   Medication Sig Dispense Refill   • [START ON 11/18/2017] tramadol (ULTRAM) 50 MG Tab Take 1-2 Tabs by mouth every 6 hours as needed for Moderate Pain or Severe Pain for up to 30 days. 240 Tab 0   • meloxicam (MOBIC) 15 MG tablet Take 1 Tab by mouth every day. 90 Tab 3   • atorvastatin (LIPITOR) 40 MG Tab Take 1 Tab by mouth every day. 90 Tab 3   • glucosamine Sulfate 500 MG Cap Take 500 mg by mouth 3 times a day, with meals.     • ascorbic acid (ASCORBIC ACID) 500 MG Tab Take 500 mg by mouth every day.     • triamcinolone acetonide (KENALOG) 0.1 % CREA Apply to affected area(s) 2 times a day. 80 g 3   • Cholecalciferol (VITAMIN D3) 5000 UNITS CAPS Take 1 Cap by mouth every day. 30 Cap 11     No current facility-administered medications on file prior to visit.        Allergies:    Allergies   Allergen Reactions   • Nkda [No Known Drug Allergy]          Vitals:    Vitals:    09/14/17 1255   BP: 128/90   Pulse: 88   Resp: 16   Temp: 36.1 °C (97 °F)   SpO2: 97%   Weight: 121.1 kg (267 lb)   Height: 1.753 m (5' 9\")       Physical Exam:    Constitutional: Vital signs reviewed. Appears well-developed and well-nourished. No acute distress.   Eyes: Sclera white, conjunctivae clear.  ENT: External ears normal. Hearing normal.  Pulmonary/Chest: Respirations non-labored.  Musculoskeletal: Normal gait. Normal range of motion. No muscular atrophy or weakness.  Neurological: Alert and oriented to person, place, and time. Muscle tone normal. Coordination normal. Light touch and sensation normal.  Skin: Diffuse erythematous wheals and papules on entire body.  Psychiatric: " Normal mood and affect. Behavior is normal. Judgment and thought content normal.       Assessment / Plan:    1. Hives  - triamcinolone acetonide (KENALOG-40) injection 60 mg; 1.5 mL by Intramuscular route Once.  - predniSONE (DELTASONE) 20 MG Tab; 1 TAB ONCE A DAY ONLY IF NEEDED FOR RASH AND ITCHING. TAKE WITH FOOD.  Dispense: 7 Tab; Refill: 0  - hydrOXYzine (ATARAX) 25 MG Tab; 1 TAB EVERY 6 HOURS ONLY IF NEEDED FOR ITCHING AND RASH. MAY CAUSE DROWSINESS.  Dispense: 20 Tab; Refill: 1    2. Obesity (BMI 35.0-39.9 without comorbidity) (HCC)  - Patient identified as having weight management issue.  Appropriate orders and counseling given.      Discussed with him DDX and management options.    Agreeable to medications given and prescribed.    Follow-up with PCP or urgent care if getting worse or not better with above.

## 2017-12-12 ENCOUNTER — TELEPHONE (OUTPATIENT)
Dept: MEDICAL GROUP | Facility: PHYSICIAN GROUP | Age: 62
End: 2017-12-12

## 2017-12-12 NOTE — TELEPHONE ENCOUNTER
Received RX Tramadol request from [facility]. M for [facility] to please schedule appointment for refill.  Pharmacy notified RX denied

## 2018-02-05 DIAGNOSIS — E78.5 HYPERLIPIDEMIA, UNSPECIFIED HYPERLIPIDEMIA TYPE: ICD-10-CM

## 2018-02-06 RX ORDER — ATORVASTATIN CALCIUM 40 MG/1
TABLET, FILM COATED ORAL
Qty: 90 TAB | Refills: 1 | Status: SHIPPED | OUTPATIENT
Start: 2018-02-06 | End: 2018-09-07 | Stop reason: SDUPTHER

## 2018-02-06 NOTE — TELEPHONE ENCOUNTER
Was the patient seen in the last year in this department? Yes     Does patient have an active prescription for medications requested? No     Received Request Via: Pharmacy      Pt met protocol?: Yes    OV 8/17      Lab Results  Component Value Date/Time   CHOLSTRLTOT 101 06/16/2017 1144   TRIGLYCERIDE 104 06/16/2017 1144   HDL 37 (A) 06/16/2017 1144   LDL 43 06/16/2017 1144

## 2018-02-07 NOTE — TELEPHONE ENCOUNTER
Pt has had OV within the 12 month protocol and lipid panel is current. 6 month supply sent to pharmacy.   Lab Results   Component Value Date/Time    CHOLSTRLTOT 101 06/16/2017 11:44 AM    LDL 43 06/16/2017 11:44 AM    HDL 37 (A) 06/16/2017 11:44 AM    TRIGLYCERIDE 104 06/16/2017 11:44 AM       Lab Results   Component Value Date/Time    SODIUM 138 06/16/2017 11:44 AM    POTASSIUM 4.3 06/16/2017 11:44 AM    CHLORIDE 108 06/16/2017 11:44 AM    CO2 24 06/16/2017 11:44 AM    GLUCOSE 109 (H) 06/16/2017 11:44 AM    BUN 18 06/16/2017 11:44 AM    CREATININE 0.83 06/16/2017 11:44 AM    CREATININE 0.9 12/26/2008 11:30 AM     Lab Results   Component Value Date/Time    ALKPHOSPHAT 51 06/16/2017 11:44 AM    ASTSGOT 17 06/16/2017 11:44 AM    ALTSGPT 19 06/16/2017 11:44 AM    TBILIRUBIN 0.5 06/16/2017 11:44 AM

## 2018-09-07 ENCOUNTER — HOSPITAL ENCOUNTER (OUTPATIENT)
Dept: RADIOLOGY | Facility: MEDICAL CENTER | Age: 63
End: 2018-09-07
Attending: FAMILY MEDICINE
Payer: COMMERCIAL

## 2018-09-07 ENCOUNTER — OFFICE VISIT (OUTPATIENT)
Dept: MEDICAL GROUP | Facility: MEDICAL CENTER | Age: 63
End: 2018-09-07
Payer: COMMERCIAL

## 2018-09-07 VITALS
TEMPERATURE: 98.1 F | DIASTOLIC BLOOD PRESSURE: 84 MMHG | SYSTOLIC BLOOD PRESSURE: 128 MMHG | HEIGHT: 69 IN | WEIGHT: 284.6 LBS | OXYGEN SATURATION: 93 % | BODY MASS INDEX: 42.15 KG/M2 | HEART RATE: 80 BPM | RESPIRATION RATE: 12 BRPM

## 2018-09-07 DIAGNOSIS — D49.0 PAROTID NEOPLASM: ICD-10-CM

## 2018-09-07 DIAGNOSIS — R73.03 PREDIABETES: Chronic | ICD-10-CM

## 2018-09-07 DIAGNOSIS — G89.29 CHRONIC HIP PAIN, RIGHT: ICD-10-CM

## 2018-09-07 DIAGNOSIS — M25.551 CHRONIC HIP PAIN, RIGHT: ICD-10-CM

## 2018-09-07 DIAGNOSIS — G89.29 CHRONIC PAIN OF RIGHT KNEE: ICD-10-CM

## 2018-09-07 DIAGNOSIS — E78.2 MIXED HYPERLIPIDEMIA: Chronic | ICD-10-CM

## 2018-09-07 DIAGNOSIS — E55.9 VITAMIN D DEFICIENCY: Chronic | ICD-10-CM

## 2018-09-07 DIAGNOSIS — E66.01 MORBID OBESITY WITH BMI OF 40.0-44.9, ADULT (HCC): ICD-10-CM

## 2018-09-07 DIAGNOSIS — M25.561 CHRONIC PAIN OF RIGHT KNEE: ICD-10-CM

## 2018-09-07 PROBLEM — Z79.891 CHRONIC USE OF OPIATE FOR THERAPEUTIC PURPOSE: Status: RESOLVED | Noted: 2017-03-22 | Resolved: 2018-09-07

## 2018-09-07 PROBLEM — E66.9 OBESITY (BMI 35.0-39.9 WITHOUT COMORBIDITY): Status: RESOLVED | Noted: 2017-09-14 | Resolved: 2018-09-07

## 2018-09-07 PROCEDURE — 99214 OFFICE O/P EST MOD 30 MIN: CPT | Performed by: FAMILY MEDICINE

## 2018-09-07 PROCEDURE — 73562 X-RAY EXAM OF KNEE 3: CPT | Mod: RT

## 2018-09-07 PROCEDURE — 73501 X-RAY EXAM HIP UNI 1 VIEW: CPT | Mod: RT

## 2018-09-07 RX ORDER — MELOXICAM 15 MG/1
15 TABLET ORAL DAILY
Qty: 90 TAB | Refills: 1 | Status: SHIPPED | OUTPATIENT
Start: 2018-09-07

## 2018-09-07 RX ORDER — ATORVASTATIN CALCIUM 40 MG/1
40 TABLET, FILM COATED ORAL DAILY
Qty: 90 TAB | Refills: 3 | Status: SHIPPED | OUTPATIENT
Start: 2018-09-07

## 2018-09-07 ASSESSMENT — PATIENT HEALTH QUESTIONNAIRE - PHQ9: CLINICAL INTERPRETATION OF PHQ2 SCORE: 0

## 2018-09-07 NOTE — ASSESSMENT & PLAN NOTE
This is a chronic health problem for this patient where he has had an arthroscopy on his right knee but he is now having chronic right knee pain again and if he walks for any extended periods of time both his right knee and his right hip will give him problems.  We will get repeat x-rays and then get him into orthopedics.

## 2018-09-07 NOTE — ASSESSMENT & PLAN NOTE
This is a chronic health problem for this patient that is limiting his ability to exercise.  Patient is hoping to retire in the next year and wants to start traveling but he suffering with both right knee and right hip pain.  We need to get x-rays and see if we can get him feeling better so that he can be more active lose some weight and enjoy alf.

## 2018-09-07 NOTE — ASSESSMENT & PLAN NOTE
This is a chronic health problem for this patient but when he goes on vitamin D supplementation his skin will start to turn red.  So he is not taking any at this time.

## 2018-09-07 NOTE — ASSESSMENT & PLAN NOTE
This is a chronic health problem for this patient that is more than likely worse than what it was.  Last blood work we have on him was from 2017 and at that time when he weighed in the 260s his blood sugar was 109.  He now weighs in the 280s.

## 2018-09-07 NOTE — ASSESSMENT & PLAN NOTE
This is a chronic health problem for this patient for which he used to take atorvastatin 40 mg daily.  Unfortunately his old provider closed her practice and he has been without medication since June.  We will get him back on his medications and recheck lab work form about 3 months down the road.

## 2018-09-07 NOTE — PROGRESS NOTES
CC:  Diagnoses of Parotid neoplasm, Mixed hyperlipidemia, Prediabetes, Chronic pain of right knee, Chronic hip pain, right, Vitamin D deficiency, Morbid obesity with BMI of 40.0-44.9, adult (HCC), and Hyperlipidemia, unspecified hyperlipidemia type were pertinent to this visit.    HISTORY OF THE PRESENT ILLNESS: Patient is a 63 y.o. male. This pleasant patient is here today to reestablish care after previously being a patient out in [place] with MARYELLEN Jones.  I have reviewed his chart and he has not had blood work done since 2017.  He tells me he has been off of his meds since June of this year because he could not get refills.  We will get the patient back on his medications and then plan to follow-up on labs.    In the meantime, patient notes he is having increasing problems with joint pain in the right knee and the right hip.  He has in the past had surgery on the right knee for arthroscopy with Dr. Gavin.  I recommended we get x-rays and then determine whether or not he needs to return to the orthopedist for further treatment.    Health Maintenance: Completed      Parotid neoplasm  This was a tumor that was found in the patient's left submandibular area when he was still smoking.  It was removed and found not to be malignant.  Were going to resolve this issue.    Mixed hyperlipidemia  This is a chronic health problem for this patient for which he used to take atorvastatin 40 mg daily.  Unfortunately his old provider closed her practice and he has been without medication since June.  We will get him back on his medications and recheck lab work form about 3 months down the road.    Prediabetes  This is a chronic health problem for this patient that is more than likely worse than what it was.  Last blood work we have on him was from 2017 and at that time when he weighed in the 260s his blood sugar was 109.  He now weighs in the 280s.    Chronic pain of right knee  This is a chronic health problem for this  "patient where he has had an arthroscopy on his right knee but he is now having chronic right knee pain again and if he walks for any extended periods of time both his right knee and his right hip will give him problems.  We will get repeat x-rays and then get him into orthopedics.    Chronic hip pain, right  This is a chronic health problem for this patient that is limiting his ability to exercise.  Patient is hoping to retire in the next year and wants to start traveling but he suffering with both right knee and right hip pain.  We need to get x-rays and see if we can get him feeling better so that he can be more active lose some weight and enjoy [facility].    Vitamin D deficiency  This is a chronic health problem for this patient but when he goes on vitamin D supplementation his skin will start to turn red.  So he is not taking any at this time.    Allergies: Nkda [no known drug allergy]    Current Outpatient Prescriptions Ordered in [facility]   Medication Sig Dispense Refill   • meloxicam (MOBIC) 15 MG tablet Take 1 Tab by mouth every day. 90 Tab 1   • atorvastatin (LIPITOR) 40 MG Tab Take 1 Tab by mouth every day. 90 Tab 3   • predniSONE (DELTASONE) 20 MG Tab 1 TAB ONCE A DAY ONLY IF NEEDED FOR RASH AND ITCHING. TAKE WITH FOOD. 7 Tab 0   • glucosamine Sulfate 500 MG Cap Take 500 mg by mouth 3 times a day, with meals.     • ascorbic acid (ASCORBIC ACID) 500 MG Tab Take 500 mg by mouth every day.     • triamcinolone acetonide (KENALOG) 0.1 % CREA Apply to affected area(s) 2 times a day. 80 g 3     No current [facility]-ordered facility-administered medications on file.        Past Medical History:   Diagnosis Date   • Allergic rhinitis, cause unspecified     controlled   • Arthritis     \"everywhere\"   • Chronic hip pain, right 9/7/2018   • Cutaneous skin tags 8/28/2014   • Dental disorder    • Elevated BP 2/8/2010   • Mixed hyperlipidemia     controlled   • Mixed hyperlipidemia 9/20/2010   • Obesity (BMI 30-39.9) 1/9/2012 "   • Osteoarthrosis, unspecified whether generalized or localized, unspecified site     controlled   • Other abnormal glucose     BS 12/08 104   • Pain     right knee   • Prediabetes 2/8/2010   • Pure hypercholesterolemia 11/9/2009   • Tobacco use disorder    • Unspecified cataract    • Unspecified vitamin D deficiency 9/20/2010       Past Surgical History:   Procedure Laterality Date   • PAROTIDECTOMY SUPERFICIAL Right 6/26/2015    Procedure: PAROTIDECTOMY SUPERFICIAL;  Surgeon: Shen Crawford M.D.;  Location: SURGERY SAME DAY [facility];  Service:    • KNEE ARTHROSCOPY  10/22/2009    Performed by WON DOTSON at SURGERY [facility]   • LOOSE BODY REMOVAL  10/22/2009    Performed by WON DOTSON at SURGERY [facility]   • MENISCECTOMY  10/22/2009    Performed by WON DOTSON at SURGERY [facility]   • HARDWARE REMOVAL ORTHO  1976    right arm   • FOREARM ORIF  1975    right       Social History   Substance Use Topics   • Smoking status: Former Smoker     Packs/day: 0.50     Years: 40.00     Types: Cigarettes, Cigars     Quit date: 4/24/2015   • Smokeless tobacco: Never Used   • Alcohol use 38.4 oz/week     10 Cans of beer, 54 Standard drinks or equivalent per week       Social History     Social History Narrative    , works at the [employer]       Family History   Problem Relation Age of Onset   • Diabetes Mother    • Hypertension Mother    • Heart Disease Father         coronary artery disease with first MI at age 40   • Hypertension Father    • Hypertension Brother    • Hyperlipidemia Brother         hypercholestolemia   • Diabetes Brother    • Diabetes Sister         Type 2 DM   • Lung Disease Sister         copd   • Diabetes Other         Maternal family   • Hyperlipidemia Other          Maternal family history of  hypercholestolemia       ROS:     - Constitutional: Negative for fever, chills, unexpected weight change, and fatigue/generalized weakness.     -  "HEENT: Negative for headaches, vision changes, hearing changes, ear pain, ear discharge, rhinorrhea, sinus congestion, sore throat, and neck pain.      - Respiratory: Negative for cough, sputum production, chest congestion, dyspnea, wheezing, and crackles.      - Cardiovascular: Negative for chest pain, palpitations, orthopnea, and bilateral lower extremity edema.     - Gastrointestinal: Negative for heartburn, nausea, vomiting, abdominal pain, hematochezia, melena, diarrhea, constipation, and greasy/foul-smelling stools.     - Genitourinary: Negative for dysuria, polyuria, hematuria, pyuria, urinary urgency, and urinary incontinence.     - Musculoskeletal: Positive as in HPI otherwise denies back pain, and joint pain.     - Skin: Negative for rash, itching, cyanotic skin color change.     - Neurological: Negative for dizziness, tingling, tremors, focal sensory deficit, focal weakness and headaches.     - Endo/Heme/Allergies: Does not bruise/bleed easily.     - Psychiatric/Behavioral: Negative for depression, suicidal/homicidal ideation and memory loss.        - NOTE: All other systems reviewed and are negative, except as in HPI.      .      Exam: Blood pressure 128/84, pulse 80, temperature 36.7 °C (98.1 °F), resp. rate 12, height 1.753 m (5' 9\"), weight (!) 129.1 kg (284 lb 9.6 oz), SpO2 93 %. Body mass index is 42.03 kg/m².    General: Normal appearing. No distress.  HEENT: Normocephalic. Eyes conjunctiva clear lids without ptosis, pupils equal and reactive to light accommodation, ears normal shape and contour, canals are clear bilaterally, tympanic membranes are benign, nasal mucosa benign, oropharynx is without erythema, edema or exudates.   Neck: Supple without JVD or bruit. Thyroid is not enlarged.  Pulmonary: Clear to ausculation.  Normal effort. No rales, ronchi, or wheezing.  Cardiovascular: Regular rate and rhythm without murmur. Carotid and radial pulses are intact and equal bilaterally.  Abdomen: Soft, " nontender, nondistended. Normal bowel sounds. Liver and spleen are not palpable  Neurologic: Grossly nonfocal  Lymph: No cervical, supraclavicular or axillary lymph nodes are palpable  Skin: Warm and dry.  No obvious lesions.  Musculoskeletal: Normal gait. No extremity cyanosis, clubbing, or edema.  Psych: Normal mood and affect. Alert and oriented x3. Judgment and insight is normal.    Please note that this dictation was created using voice recognition software. I have made every reasonable attempt to correct obvious errors, but I expect that there are errors of grammar and possibly content that I did not discover before finalizing the note.      Assessment/Plan  1. Parotid neoplasm  This problem is resolved.  Patient had removal of the parotid neoplasm and it turned out to be benign.  He has had no recurrence.    2. Mixed hyperlipidemia  Uncontrolled, patient's been off of his statin.  He will go back on and we will recheck lab work in 3 months.  - COMP METABOLIC PANEL; Future  - LIPID PROFILE; Future  - CBC WITH DIFFERENTIAL; Future    3. Prediabetes  Uncontrolled, patient will work on weight loss and we will plan to recheck this in 3 months.    4. Chronic pain of right knee  Uncontrolled, patient's noting increasing problems with right knee pain and would like to go to orthopedics depending on results of x-ray.  - DX-KNEE 3 VIEWS RIGHT; Future    5. Chronic hip pain, right  Uncontrolled, patient needs to get an x-ray of his right hip and see whether or not anything can be done through the orthopedist to try and help him with his pain.  - DX-HIP-UNILATERAL-WITH PELVIS-1 VIEW RIGHT; Future    6. Vitamin D deficiency  Uncontrolled, patient is not able to take vitamin D because it causes dermatitis.  We will see where his levels up.  - VITAMIN D,25 HYDROXY; Future    7. Morbid obesity with BMI of 40.0-44.9, adult (HCC)  Uncontrolled, patient has not been successful with weight loss.  He is going to try and be more  physically active.  - Patient identified as having weight management issue.  Appropriate orders and counseling given.

## 2018-09-07 NOTE — ASSESSMENT & PLAN NOTE
This was a tumor that was found in the patient's left submandibular area when he was still smoking.  It was removed and found not to be malignant.  Were going to resolve this issue.

## 2018-09-14 DIAGNOSIS — M17.11 PRIMARY OSTEOARTHRITIS OF RIGHT KNEE: ICD-10-CM

## 2018-11-27 ENCOUNTER — HOSPITAL ENCOUNTER (OUTPATIENT)
Dept: LAB | Facility: MEDICAL CENTER | Age: 63
End: 2018-11-27
Attending: FAMILY MEDICINE
Payer: COMMERCIAL

## 2018-11-27 DIAGNOSIS — E78.2 MIXED HYPERLIPIDEMIA: Chronic | ICD-10-CM

## 2018-11-27 DIAGNOSIS — E55.9 VITAMIN D DEFICIENCY: Chronic | ICD-10-CM

## 2018-11-27 LAB
ALBUMIN SERPL BCP-MCNC: 4.7 G/DL (ref 3.2–4.9)
ALBUMIN/GLOB SERPL: 1.7 G/DL
ALP SERPL-CCNC: 57 U/L (ref 30–99)
ALT SERPL-CCNC: 22 U/L (ref 2–50)
ANION GAP SERPL CALC-SCNC: 9 MMOL/L (ref 0–11.9)
AST SERPL-CCNC: 15 U/L (ref 12–45)
BASOPHILS # BLD AUTO: 1 % (ref 0–1.8)
BASOPHILS # BLD: 0.06 K/UL (ref 0–0.12)
BILIRUB SERPL-MCNC: 0.7 MG/DL (ref 0.1–1.5)
BUN SERPL-MCNC: 21 MG/DL (ref 8–22)
CALCIUM SERPL-MCNC: 9.6 MG/DL (ref 8.5–10.5)
CHLORIDE SERPL-SCNC: 104 MMOL/L (ref 96–112)
CHOLEST SERPL-MCNC: 142 MG/DL (ref 100–199)
CO2 SERPL-SCNC: 26 MMOL/L (ref 20–33)
CREAT SERPL-MCNC: 0.9 MG/DL (ref 0.5–1.4)
EOSINOPHIL # BLD AUTO: 0.22 K/UL (ref 0–0.51)
EOSINOPHIL NFR BLD: 3.8 % (ref 0–6.9)
ERYTHROCYTE [DISTWIDTH] IN BLOOD BY AUTOMATED COUNT: 43.5 FL (ref 35.9–50)
FASTING STATUS PATIENT QL REPORTED: NORMAL
GLOBULIN SER CALC-MCNC: 2.7 G/DL (ref 1.9–3.5)
GLUCOSE SERPL-MCNC: 97 MG/DL (ref 65–99)
HCT VFR BLD AUTO: 45.9 % (ref 42–52)
HDLC SERPL-MCNC: 46 MG/DL
HGB BLD-MCNC: 15.2 G/DL (ref 14–18)
IMM GRANULOCYTES # BLD AUTO: 0.01 K/UL (ref 0–0.11)
IMM GRANULOCYTES NFR BLD AUTO: 0.2 % (ref 0–0.9)
LDLC SERPL CALC-MCNC: 72 MG/DL
LYMPHOCYTES # BLD AUTO: 1.7 K/UL (ref 1–4.8)
LYMPHOCYTES NFR BLD: 29.1 % (ref 22–41)
MCH RBC QN AUTO: 30.3 PG (ref 27–33)
MCHC RBC AUTO-ENTMCNC: 33.1 G/DL (ref 33.7–35.3)
MCV RBC AUTO: 91.6 FL (ref 81.4–97.8)
MONOCYTES # BLD AUTO: 0.54 K/UL (ref 0–0.85)
MONOCYTES NFR BLD AUTO: 9.2 % (ref 0–13.4)
NEUTROPHILS # BLD AUTO: 3.31 K/UL (ref 1.82–7.42)
NEUTROPHILS NFR BLD: 56.7 % (ref 44–72)
NRBC # BLD AUTO: 0 K/UL
NRBC BLD-RTO: 0 /100 WBC
PLATELET # BLD AUTO: 244 K/UL (ref 164–446)
PMV BLD AUTO: 9.8 FL (ref 9–12.9)
POTASSIUM SERPL-SCNC: 4 MMOL/L (ref 3.6–5.5)
PROT SERPL-MCNC: 7.4 G/DL (ref 6–8.2)
RBC # BLD AUTO: 5.01 M/UL (ref 4.7–6.1)
SODIUM SERPL-SCNC: 139 MMOL/L (ref 135–145)
TRIGL SERPL-MCNC: 122 MG/DL (ref 0–149)
WBC # BLD AUTO: 5.8 K/UL (ref 4.8–10.8)

## 2018-11-27 PROCEDURE — 85025 COMPLETE CBC W/AUTO DIFF WBC: CPT

## 2018-11-27 PROCEDURE — 82306 VITAMIN D 25 HYDROXY: CPT

## 2018-11-27 PROCEDURE — 80061 LIPID PANEL: CPT

## 2018-11-27 PROCEDURE — 80053 COMPREHEN METABOLIC PANEL: CPT

## 2018-11-27 PROCEDURE — 36415 COLL VENOUS BLD VENIPUNCTURE: CPT

## 2018-11-28 LAB — 25(OH)D3 SERPL-MCNC: 26 NG/ML (ref 30–100)

## 2019-03-25 DIAGNOSIS — L30.1 DYSHIDROTIC ECZEMA: ICD-10-CM

## 2019-03-25 RX ORDER — TRIAMCINOLONE ACETONIDE 1 MG/G
CREAM TOPICAL
Qty: 60 G | Refills: 3 | Status: SHIPPED | OUTPATIENT
Start: 2019-03-25

## 2021-10-20 ENCOUNTER — TELEPHONE (OUTPATIENT)
Dept: HEALTH INFORMATION MANAGEMENT | Facility: OTHER | Age: 66
End: 2021-10-20

## 2022-12-17 ENCOUNTER — APPOINTMENT (OUTPATIENT)
Dept: URGENT CARE | Facility: PHYSICIAN GROUP | Age: 67
End: 2022-12-17
Payer: MEDICARE

## 2024-11-30 ENCOUNTER — OFFICE VISIT (OUTPATIENT)
Dept: URGENT CARE | Facility: PHYSICIAN GROUP | Age: 69
End: 2024-11-30
Payer: COMMERCIAL

## 2024-11-30 VITALS
DIASTOLIC BLOOD PRESSURE: 70 MMHG | WEIGHT: 294 LBS | BODY MASS INDEX: 44.56 KG/M2 | HEART RATE: 74 BPM | SYSTOLIC BLOOD PRESSURE: 128 MMHG | RESPIRATION RATE: 20 BRPM | HEIGHT: 68 IN | OXYGEN SATURATION: 96 % | TEMPERATURE: 97.8 F

## 2024-11-30 DIAGNOSIS — J44.1 COPD EXACERBATION (HCC): ICD-10-CM

## 2024-11-30 PROCEDURE — 3074F SYST BP LT 130 MM HG: CPT | Performed by: NURSE PRACTITIONER

## 2024-11-30 PROCEDURE — 3078F DIAST BP <80 MM HG: CPT | Performed by: NURSE PRACTITIONER

## 2024-11-30 PROCEDURE — 99203 OFFICE O/P NEW LOW 30 MIN: CPT | Performed by: NURSE PRACTITIONER

## 2024-11-30 RX ORDER — IBUPROFEN 800 MG/1
TABLET, FILM COATED ORAL
COMMUNITY
Start: 2024-09-19

## 2024-11-30 RX ORDER — PREDNISONE 20 MG/1
40 TABLET ORAL DAILY
Qty: 10 TABLET | Refills: 0 | Status: SHIPPED | OUTPATIENT
Start: 2024-11-30 | End: 2024-12-05

## 2024-11-30 RX ORDER — DOXYCYCLINE HYCLATE 100 MG
100 TABLET ORAL 2 TIMES DAILY
Qty: 14 TABLET | Refills: 0 | Status: SHIPPED | OUTPATIENT
Start: 2024-11-30 | End: 2024-12-07

## 2024-11-30 RX ORDER — FLUTICASONE PROPIONATE 50 MCG
SPRAY, SUSPENSION (ML) NASAL
COMMUNITY
Start: 2023-12-20 | End: 2025-06-12

## 2024-11-30 RX ORDER — CETIRIZINE HYDROCHLORIDE 10 MG/1
TABLET ORAL
COMMUNITY
Start: 2023-12-20 | End: 2024-12-14

## 2024-11-30 ASSESSMENT — ENCOUNTER SYMPTOMS
FEVER: 0
SWOLLEN GLANDS: 0
SHORTNESS OF BREATH: 1
COUGH: 1
SPUTUM PRODUCTION: 0
WHEEZING: 1

## 2024-11-30 NOTE — PROGRESS NOTES
"Subjective:     Sincere Tinsley is a 69 y.o. male who presents for Wheezing (Pt sts he has been wheezing at night and it keeps him from sleeping, his inhaler isnt helping him much /Requesting rx for prednisone )      Wheezing   This is a new problem. The current episode started in the past 7 days (Sincere is a pleasant 69 year old male who presents to  today with SOB, wheezing and dry cough). Associated symptoms include coughing and shortness of breath. Pertinent negatives include no fever, sputum production or swollen glands. He has tried beta agonist inhalers for the symptoms. There is no history of asthma. Previous smoker         Review of Systems   Constitutional:  Negative for fever.   Respiratory:  Positive for cough, shortness of breath and wheezing. Negative for sputum production.        PMH:   Past Medical History:   Diagnosis Date   • Allergic rhinitis, cause unspecified     controlled   • Arthritis     \"everywhere\"   • Chronic hip pain, right 9/7/2018   • Cutaneous skin tags 8/28/2014   • Dental disorder    • Elevated BP 2/8/2010   • Mixed hyperlipidemia     controlled   • Mixed hyperlipidemia 9/20/2010   • Obesity (BMI 30-39.9) 1/9/2012   • Osteoarthrosis, unspecified whether generalized or localized, unspecified site     controlled   • Other abnormal glucose     BS 12/08 104   • Pain     right knee   • Prediabetes 2/8/2010   • Pure hypercholesterolemia 11/9/2009   • Tobacco use disorder    • Unspecified cataract    • Unspecified vitamin D deficiency 9/20/2010     ALLERGIES:   Allergies   Allergen Reactions   • Atorvastatin Unspecified     Weight gain, rash, and joint pain    • Nkda [No Known Drug Allergy]      SURGHX:   Past Surgical History:   Procedure Laterality Date   • PAROTIDECTOMY SUPERFICIAL Right 6/26/2015    Procedure: PAROTIDECTOMY SUPERFICIAL;  Surgeon: Shen Crawford M.D.;  Location: SURGERY SAME DAY [facility];  Service:    • KNEE ARTHROSCOPY  10/22/2009    Performed by WON DOTSON " "R at SURGERY [facility]   • LOOSE BODY REMOVAL  10/22/2009    Performed by WON DOTSON at SURGERY [facility]   • MENISCECTOMY  10/22/2009    Performed by WON DOTSON at SURGERY [facility]   • HARDWARE REMOVAL ORTHO      right arm   • FOREARM ORIF      right     SOCHX:   Social History     Socioeconomic History   • Marital status:    Tobacco Use   • Smoking status: Former     Current packs/day: 0.00     Average packs/day: 0.5 packs/day for 40.0 years (20.0 ttl pk-yrs)     Types: Cigarettes, Cigars     Start date: 1975     Quit date: 2015     Years since quittin.6   • Smokeless tobacco: Never   Substance and Sexual Activity   • Alcohol use: Yes     Alcohol/week: 38.4 oz     Types: 10 Cans of beer, 54 Standard drinks or equivalent per week   • Drug use: No   • Sexual activity: Yes     Partners: Female     Birth control/protection: Post-Menopausal     Comment: , works at  [employer]   Other Topics Concern   • Exercise Yes     Comment: doing rehab for knee surgery   Social History Narrative    , works at the [employer]     FH:   Family History   Problem Relation Age of Onset   • Diabetes Mother    • Hypertension Mother    • Heart Disease Father         coronary artery disease with first MI at age 40   • Hypertension Father    • Hypertension Brother    • Hyperlipidemia Brother         hypercholestolemia   • Diabetes Brother    • Diabetes Sister         Type 2 DM   • Lung Disease Sister         copd   • Diabetes Other         Maternal family   • Hyperlipidemia Other          Maternal family history of  hypercholestolemia         Objective:   /70   Pulse 74   Temp 36.6 °C (97.8 °F) (Temporal)   Resp 20   Ht 1.727 m (5' 8\")   Wt (!) 133 kg (294 lb)   SpO2 96%   BMI 44.70 kg/m²     Physical Exam    Assessment/Plan:   Assessment        " ill-appearing or toxic-appearing.   HENT:      Head: Normocephalic.      Right Ear: Tympanic membrane, ear canal and external ear normal.      Left Ear: Tympanic membrane, ear canal and external ear normal.      Nose: Nose normal.      Mouth/Throat:      Mouth: Mucous membranes are moist.   Eyes:      General:         Right eye: No discharge.         Left eye: No discharge.      Extraocular Movements: Extraocular movements intact.      Conjunctiva/sclera: Conjunctivae normal.      Pupils: Pupils are equal, round, and reactive to light.   Cardiovascular:      Rate and Rhythm: Normal rate and regular rhythm.   Pulmonary:      Effort: Pulmonary effort is normal. No respiratory distress.      Breath sounds: Normal breath sounds. No stridor. No wheezing, rhonchi or rales.   Chest:      Chest wall: No tenderness.   Abdominal:      General: Abdomen is flat.   Musculoskeletal:         General: Normal range of motion.      Cervical back: Normal range of motion and neck supple. No rigidity.   Lymphadenopathy:      Cervical: No cervical adenopathy.   Skin:     General: Skin is warm and dry.   Neurological:      General: No focal deficit present.      Mental Status: He is alert and oriented to person, place, and time. Mental status is at baseline.   Psychiatric:         Mood and Affect: Mood normal.         Behavior: Behavior normal.         Judgment: Judgment normal.         Assessment/Plan:   Assessment    1. COPD exacerbation (HCC)  doxycycline (VIBRAMYCIN) 100 MG Tab    predniSONE (DELTASONE) 20 MG Tab        Patient was started on prednisone and doxycycline for suspected COPD exacerbation.  Lung sounds are clear in the clinic today however, this worsen at night.  Patient is a former smoker and notes that he has been told by his former doctor that he did have mild COPD. We discussed supportive measures including humidifier, warm salt water gargles, over-the-counter Cepacol throat lozenges, rest  and increased fluids. Pt was  encouraged to seek treatment back in the ER or urgent care for worsening symptoms,  fever greater than 100.5, wheezes or shortness of breath.